# Patient Record
Sex: FEMALE | URBAN - METROPOLITAN AREA
[De-identification: names, ages, dates, MRNs, and addresses within clinical notes are randomized per-mention and may not be internally consistent; named-entity substitution may affect disease eponyms.]

---

## 2024-03-13 ENCOUNTER — OFFICE VISIT (OUTPATIENT)
Dept: PRIMARY CARE | Facility: CLINIC | Age: 34
End: 2024-03-13
Payer: COMMERCIAL

## 2024-03-13 VITALS
HEIGHT: 63 IN | DIASTOLIC BLOOD PRESSURE: 81 MMHG | HEART RATE: 94 BPM | BODY MASS INDEX: 32.96 KG/M2 | OXYGEN SATURATION: 97 % | SYSTOLIC BLOOD PRESSURE: 125 MMHG | WEIGHT: 186 LBS

## 2024-03-13 DIAGNOSIS — R11.2 NAUSEA AND VOMITING, UNSPECIFIED VOMITING TYPE: ICD-10-CM

## 2024-03-13 DIAGNOSIS — R10.84 GENERALIZED ABDOMINAL PAIN: ICD-10-CM

## 2024-03-13 PROCEDURE — 99213 OFFICE O/P EST LOW 20 MIN: CPT | Performed by: FAMILY MEDICINE

## 2024-03-13 PROCEDURE — 1036F TOBACCO NON-USER: CPT | Performed by: FAMILY MEDICINE

## 2024-03-13 RX ORDER — SERTRALINE HYDROCHLORIDE 50 MG/1
50 TABLET, FILM COATED ORAL DAILY
COMMUNITY
Start: 2023-07-02 | End: 2024-04-09 | Stop reason: ALTCHOICE

## 2024-03-13 NOTE — PROGRESS NOTES
"Subjective   Patient ID: Ana Paula Lucas [id number] is a 33 y.o. female who presents for Follow-up (Hospital /Form filled out for insurance ).    HPI   Patient noticing some abdominal pain nausea and vomiting.  Abdominal pain more in the lower abdominal area.  No fever or chills.    Social History     Tobacco Use    Smoking status: Never    Smokeless tobacco: Never       Allergies   Allergen Reactions    Amoxicillin Unknown    Penicillin G Unknown       Current Outpatient Medications   Medication Sig Dispense Refill    sertraline (Zoloft) 50 mg tablet Take 1 tablet (50 mg) by mouth once daily.       No current facility-administered medications for this visit.       Physical Exam  /81 (BP Location: Left arm, Patient Position: Sitting, BP Cuff Size: Adult)   Pulse 94   Ht 1.6 m (5' 3\")   Wt 84.4 kg (186 lb)   SpO2 97%   BMI 32.95 kg/m²     General Appearance:  Alert, cooperative, no distress,   Head:  Normocephalic, atraumatic   Eyes:  PERRL, conjunctiva/corneas clear, EOM's intact,    Lungs:   Clear to auscultation bilaterally, respirations unlabored   Heart:  Regular rate and rhythm, S1 and S2 normal, no murmur,    Neurologic: Normal                            Abdomen : soft , BS normal , Lower abdominal minimal tenderness  No results found for any previous visit.       Assessment/Plan   Diagnoses and all orders for this visit:  Generalized abdominal pain  -     CBC and Auto Differential; Future  -     Urinalysis with Reflex Microscopic; Future  -     Urine Culture; Future  Nausea and vomiting, unspecified vomiting type  -     CBC and Auto Differential; Future  -     Urinalysis with Reflex Microscopic; Future  -     Urine Culture; Future    "

## 2024-04-09 ENCOUNTER — OFFICE VISIT (OUTPATIENT)
Dept: PRIMARY CARE | Facility: CLINIC | Age: 34
End: 2024-04-09
Payer: COMMERCIAL

## 2024-04-09 VITALS
HEART RATE: 99 BPM | OXYGEN SATURATION: 100 % | WEIGHT: 186 LBS | DIASTOLIC BLOOD PRESSURE: 70 MMHG | HEIGHT: 63 IN | SYSTOLIC BLOOD PRESSURE: 100 MMHG | BODY MASS INDEX: 32.96 KG/M2 | TEMPERATURE: 97.6 F

## 2024-04-09 DIAGNOSIS — F41.8 DEPRESSION WITH ANXIETY: ICD-10-CM

## 2024-04-09 DIAGNOSIS — Z00.00 VISIT FOR PREVENTIVE HEALTH EXAMINATION: Primary | ICD-10-CM

## 2024-04-09 PROCEDURE — 99395 PREV VISIT EST AGE 18-39: CPT | Performed by: FAMILY MEDICINE

## 2024-04-09 PROCEDURE — 1036F TOBACCO NON-USER: CPT | Performed by: FAMILY MEDICINE

## 2024-04-09 RX ORDER — PREDNISONE 20 MG/1
40 TABLET ORAL DAILY
COMMUNITY
Start: 2023-12-31 | End: 2024-01-05

## 2024-04-09 RX ORDER — DICYCLOMINE HYDROCHLORIDE 10 MG/1
10 CAPSULE ORAL 4 TIMES DAILY
COMMUNITY
Start: 2024-02-28 | End: 2024-03-06

## 2024-04-09 RX ORDER — SERTRALINE HYDROCHLORIDE 100 MG/1
100 TABLET, FILM COATED ORAL DAILY
COMMUNITY
End: 2024-04-09 | Stop reason: SDUPTHER

## 2024-04-09 RX ORDER — ALBUTEROL SULFATE 90 UG/1
AEROSOL, METERED RESPIRATORY (INHALATION)
COMMUNITY
Start: 2023-12-31

## 2024-04-09 RX ORDER — IPRATROPIUM BROMIDE AND ALBUTEROL SULFATE 2.5; .5 MG/3ML; MG/3ML
SOLUTION RESPIRATORY (INHALATION)
COMMUNITY
Start: 2023-12-31

## 2024-04-09 RX ORDER — PANTOPRAZOLE SODIUM 40 MG/1
TABLET, DELAYED RELEASE ORAL
COMMUNITY
Start: 2024-03-26

## 2024-04-09 RX ORDER — ONDANSETRON 4 MG/1
TABLET, ORALLY DISINTEGRATING ORAL
COMMUNITY
Start: 2024-02-28

## 2024-04-09 RX ORDER — POLYETHYLENE GLYCOL-3350 AND ELECTROLYTES 236; 6.74; 5.86; 2.97; 22.74 G/274.31G; G/274.31G; G/274.31G; G/274.31G; G/274.31G
POWDER, FOR SOLUTION ORAL
COMMUNITY
Start: 2024-03-13 | End: 2024-04-09 | Stop reason: ALTCHOICE

## 2024-04-09 RX ORDER — SERTRALINE HYDROCHLORIDE 50 MG/1
50 TABLET, FILM COATED ORAL DAILY
Qty: 90 TABLET | Refills: 2 | Status: SHIPPED | OUTPATIENT
Start: 2024-04-09 | End: 2025-04-09

## 2024-04-09 ASSESSMENT — PROMIS GLOBAL HEALTH SCALE
RATE_GENERAL_HEALTH: FAIR
RATE_SOCIAL_SATISFACTION: GOOD
RATE_AVERAGE_PAIN: 1
RATE_PHYSICAL_HEALTH: FAIR
CARRYOUT_SOCIAL_ACTIVITIES: GOOD
RATE_AVERAGE_FATIGUE: SEVERE
RATE_QUALITY_OF_LIFE: GOOD
EMOTIONAL_PROBLEMS: RARELY
CARRYOUT_PHYSICAL_ACTIVITIES: MOSTLY
RATE_MENTAL_HEALTH: FAIR

## 2024-04-09 NOTE — PROGRESS NOTES
Subjective   Patient ID: Ana Paula Lucas is a 33 y.o. female who presents for Establish Care and Annual Exam.    Assessment/Plan     Problem List Items Addressed This Visit    None  Visit Diagnoses       Visit for preventive health examination    -  Primary    Relevant Orders    TSH with reflex to Free T4 if abnormal    Basic Metabolic Panel    Lipid Panel    TSH with reflex to Free T4 if abnormal    Depression with anxiety        Relevant Medications    sertraline (Zoloft) 50 mg tablet          Discussed about diet exercise  Discussed about age-related immunization  Discussed about fasting lab work  Follow-up every year for physical    Pap [id number]  Come back early with any new signs and symptoms  Patient understood and agreed with plan.      HPI  33-year-old female here for physical    Depression - on zoloft -will decrease the dose as patient was taking 50 mg since last few months tolerating okay without any worsening depression or anxiety  Patient is planning for pregnancy advised to call us we may discontinue Zoloft at that time patient agrees  Grandfather - had colon cancer - late 60's      No smoking  Rare alcohol use  No exercise      No significant past medical or surgical history  Recently patient had abdominal pain had extensive workup done with GI including EGD colonoscopy showed inflammation      Working as a  in [place]  Up-to-date with Pap smear    Allergies   Allergen Reactions    Amoxicillin Unknown    Cefaclor Other    Penicillin G Unknown       Current Outpatient Medications   Medication Sig Dispense Refill    pantoprazole (ProtoNix) 40 mg EC tablet       albuterol 90 mcg/actuation inhaler INHALE 2 APPLICATION (INHALATION) EVERY 6 HOURS AS NEEDED - COUGH FOR 14 DAYS      ipratropium-albuteroL (Duo-Neb) 0.5-2.5 mg/3 mL nebulizer solution TAKE 1 NEBULIZER (INHALATION) EVERY 6 HOURS AS NEEDED FOR - WHEEZING FOR 14 DAYS      ondansetron ODT (Zofran-ODT) 4 mg disintegrating tablet  "DISSLOVE 1 TABLET ON THE TONGUE EVERY 8 HOURS AS NEEDED FOR NASUEA AND VOMITING      sertraline (Zoloft) 50 mg tablet Take 1 tablet (50 mg) by mouth once daily. 90 tablet 2     No current facility-administered medications for this visit.       Objective   Visit Vitals  /70 (BP Location: Left arm, Patient Position: Sitting)   Pulse 99   Temp 36.4 °C (97.6 °F)   Ht 1.6 m (5' 3\")   Wt 84.4 kg (186 lb)   SpO2 100%   BMI 32.95 kg/m²   Smoking Status Never   BSA 1.94 m²     Physical Exam    Constitutional   General appearance: Alert and in no acute distress.   Head and Face   Head and face: Normal.     Palpation of the face and sinuses: Normal.    Eyes   Inspection of eyes: Sclera and conjunctiva were normal.    Pupil exam: Pupils were equal in size. Extraocular movements were intact.   Ears, Nose, Mouth, and Throat   Ears: Auricles: Normal.    Otoscopic examination: Tympanic membranes: Normal with no congestion and no discharge. Otic Canals: Normal without tenderness, congestion or discharge.    Hearing: Normal.     Nasal mucosa, septum, and turbinates: Normal without edema or erythema.    Lips, teeth, and gums: Normal.    Oropharynx: Normal with moist mucus membranes, no congestion. Tonsils: Normal no follicles.   Neck   Neck Exam: Appearance of the neck was normal. No neck masses observed.    Thyroid exam: Not enlarged and no palpable thyroid nodules.   Pulmonary   Respiratory assessment: No respiratory distress, normal respiratory rhythm and effort.    Auscultation of Lungs: Clear bilateral breath sounds.   Cardiovascular   Auscultation of heart: Apical pulse normal, heart rate and rhythm normal, normal S1 and S2, no murmurs and no pericardial rub.    Carotid arteries: Pulses normal with no bruits.    Exam for edema: No peripheral edema.   Chest   Chest: Normal A_P diameter, no pulsation, no intercostal withdrawing. Trachea central.   Abdomen   Abdominal Exam: No bruits, normal bowel sounds, soft, non-tender, no " abdominal mass palpated.    Liver and Spleen exam: No hepato-splenomegaly.    Examination for hernias: Normal.      Lymphatic   Palpation of lymph nodes in neck: No cervical lymphadenopathy.   Musculoskeletal   Examination of gait: Normal.    Inspection of digits and nails: No clubbing or cyanosis of the fingernails.    Inspection/palpation of joints, bones and muscles: No joint swelling. Normal movement of all extremities.    Range of Motion: Normal movement of all extremities.   Skin   Skin inspection: Normal skin color and pigmentation, normal skin turgor and no visible rash.   Neurologic   Cranial nerves: Nerves 2-12 were intact, no focal neuro defects.    Reflexes: Normal.     Sensation: Normal.     Coordination: Normal.    Psychiatric   Judgment and insight: Intact.    Orientation: Oriented to person, place, and time.    Recent and remote memory: Normal.     Mood and affect: Normal.     Genitourinary -follow-up with OB/GYN   Immunization History   Administered Date(s) Administered    Moderna SARS-CoV-2 Vaccination 03/03/2021, 03/31/2021, 12/03/2021       Review of Systems    No results found for any previous visit.       Radiology: Reviewed imaging in powerchart.  No results found.    Family History   Problem Relation Name Age of Onset    Depression Mother Alexus     Hernia Mother Alexus     Depression Father Dennis     Diabetes Father Dennis     Hearing loss Maternal Grandfather Holland     Heart disease Maternal Grandfather Brian     Hypertension Maternal Grandmother Kipling     Stroke Maternal Grandmother Kipling     Vision loss Maternal Grandmother Kipling     Blood clot Paternal Grandfather Cohn     Cancer Paternal Grandfather Cohn     Colon cancer Paternal Grandfather Cohn     Diabetes Paternal Grandfather Cohn     Heart disease Paternal Grandfather Cohn     Hearing loss Paternal Grandmother Johanna      Social History     Socioeconomic History    Marital status:      Spouse name: None     Number of children: None    Years of education: None    Highest education level: None   Occupational History    None   Tobacco Use    Smoking status: Never    Smokeless tobacco: Never   Substance and Sexual Activity    Alcohol use: Not Currently     Comment: Trying to conceive, not consuming alchol at this time    Drug use: Never    Sexual activity: Yes     Partners: Male     Birth control/protection: None     Comment: Trying to conceive   Other Topics Concern    None   Social History Narrative    None     Social Determinants of Health     Financial Resource Strain: Not on file   Food Insecurity: Not on file   Transportation Needs: Not on file   Physical Activity: Not on file   Stress: Not on file   Social Connections: Not on file   Intimate Partner Violence: Not on file   Housing Stability: Not on file     Past Medical History:   Diagnosis Date    Allergic     Anxiety     Asthma     Depression     Scoliosis     Varicella      History reviewed. No pertinent surgical history.    Charting was completed using voice recognition technology and may include unintended errors.

## 2024-04-10 ENCOUNTER — LAB (OUTPATIENT)
Dept: LAB | Facility: LAB | Age: 34
End: 2024-04-10
Payer: COMMERCIAL

## 2024-04-10 DIAGNOSIS — Z00.00 VISIT FOR PREVENTIVE HEALTH EXAMINATION: ICD-10-CM

## 2024-04-10 LAB
ANION GAP SERPL CALC-SCNC: 15 MMOL/L (ref 10–20)
BUN SERPL-MCNC: 14 MG/DL (ref 6–23)
CALCIUM SERPL-MCNC: 10.1 MG/DL (ref 8.6–10.6)
CHLORIDE SERPL-SCNC: 103 MMOL/L (ref 98–107)
CHOLEST SERPL-MCNC: 180 MG/DL (ref 0–199)
CHOLESTEROL/HDL RATIO: 3.2
CO2 SERPL-SCNC: 25 MMOL/L (ref 21–32)
CREAT SERPL-MCNC: 0.69 MG/DL (ref 0.5–1.05)
EGFRCR SERPLBLD CKD-EPI 2021: >90 ML/MIN/1.73M*2
GLUCOSE SERPL-MCNC: 81 MG/DL (ref 74–99)
HDLC SERPL-MCNC: 55.4 MG/DL
LDLC SERPL CALC-MCNC: 104 MG/DL
NON HDL CHOLESTEROL: 125 MG/DL (ref 0–149)
POTASSIUM SERPL-SCNC: 4.4 MMOL/L (ref 3.5–5.3)
SODIUM SERPL-SCNC: 139 MMOL/L (ref 136–145)
TRIGL SERPL-MCNC: 104 MG/DL (ref 0–149)
VLDL: 21 MG/DL (ref 0–40)

## 2024-04-10 PROCEDURE — 80061 LIPID PANEL: CPT

## 2024-04-10 PROCEDURE — 80048 BASIC METABOLIC PNL TOTAL CA: CPT

## 2024-04-10 PROCEDURE — 36415 COLL VENOUS BLD VENIPUNCTURE: CPT

## 2024-04-10 PROCEDURE — 84443 ASSAY THYROID STIM HORMONE: CPT

## 2024-04-11 LAB — TSH SERPL-ACNC: 2.79 MIU/L (ref 0.44–3.98)

## 2025-01-07 DIAGNOSIS — F41.8 DEPRESSION WITH ANXIETY: ICD-10-CM

## 2025-01-07 RX ORDER — SERTRALINE HYDROCHLORIDE 50 MG/1
50 TABLET, FILM COATED ORAL DAILY
Qty: 90 TABLET | Refills: 0 | Status: SHIPPED | OUTPATIENT
Start: 2025-01-07